# Patient Record
(demographics unavailable — no encounter records)

---

## 2025-05-23 NOTE — DISCUSSION/SUMMARY
[FreeTextEntry1] : Takotsubo cardiomyopathy Patient still has a lot of anxiety in life. Spoke to the patient in depth about reducing her responsibilities and anxiety and starting to take care of herself. Will do blood work with BMP BNP lipid panel CBC now. Patricio for 1 week. TTE.  [EKG obtained to assist in diagnosis and management of assessed problem(s)] : EKG obtained to assist in diagnosis and management of assessed problem(s)

## 2025-05-23 NOTE — HISTORY OF PRESENT ILLNESS
[FreeTextEntry1] : 71-year-old female with hypertension, anxiety, recent Takotsubo cardiomyopathy with a EF of 45% with basal hypokinesis and mid hypokinesis and apical akinesis on LV gram.  Patient is on beta-blocker and ARB.  Patient still sometimes having the feeling of shortness of breath and dizziness.  She still under a lot of pressure and responsibility of her pets horses and her  who is having Parkinson's disease.  She also has a home that requires a lot of attention and renovation.

## 2025-06-26 NOTE — HISTORY OF PRESENT ILLNESS
[FreeTextEntry1] : 71-year-old female with hypertension, anxiety, recent Takotsubo cardiomyopathy with a EF of 45% with basal hypokinesis and mid hypokinesis and apical akinesis on LV gram.  Patient is on beta-blocker and ARB.  Patient still sometimes having the feeling of shortness of breath and dizziness.  She still under a lot of pressure and responsibility of her pets horses and her  who is having Parkinson's disease.  She also has a home that requires a lot of attention and renovation.  6/26/2025 patient is still working a lot.  The new echo shows normal EF.  Patient is only on beta-blockers now.  Blood pressure is borderline.  Patient still has dyspnea on exertion.  BNP is normal.  Holter monitor showed short episodes of AT and 1 episode of NSVT 7 beats which patient did not feel.

## 2025-06-26 NOTE — DISCUSSION/SUMMARY
[FreeTextEntry1] : Takotsubo cardiomyopathy Patient still has a lot of anxiety in life. Spoke to the patient in depth about reducing her responsibilities and anxiety and starting to take care of herself.   EF has normalized. Holter is unremarkable with an episode of NSVT that patient did not feel. Will continue the beta-blocker. Cardiac rehab [EKG obtained to assist in diagnosis and management of assessed problem(s)] : EKG obtained to assist in diagnosis and management of assessed problem(s)

## 2025-07-10 NOTE — HISTORY OF PRESENT ILLNESS
[FreeTextEntry1] : 71-year-old female with hypertension, anxiety, recent Takotsubo cardiomyopathy with a EF of 45% with basal hypokinesis and mid hypokinesis and apical akinesis on LV gram.  Patient is on beta-blocker and ARB.  Patient still sometimes having the feeling of shortness of breath and dizziness.  She still under a lot of pressure and responsibility of her pets horses and her  who is having Parkinson's disease.  She also has a home that requires a lot of attention and renovation.  6/26/2025 patient is still working a lot.  The new echo shows normal EF.  Patient is only on beta-blockers now.  Blood pressure is borderline.  Patient still has dyspnea on exertion.  BNP is normal.  Holter monitor showed short episodes of AT and 1 episode of NSVT 7 beats which patient did not feel.  7/10/2025 patient is feeling fine.  No chest pain/shortness of breath/dyspnea on exertion/palpitation/syncope.  Patient is depressed and anxious regarding have a lot of responsibilities in life.

## 2025-07-10 NOTE — DISCUSSION/SUMMARY
[FreeTextEntry1] : Takotsubo cardiomyopathy Patient still has a lot of anxiety in life. Spoke to the patient in depth about reducing her responsibilities and anxiety and starting to take care of herself. Had a long conversation with the patient regarding her anxiety.  Patient will need to follow-up with her therapist/psych.  EF has normalized. Holter is unremarkable with an episode of NSVT that patient did not feel. Will continue the beta-blocker. Cardiac rehab [EKG obtained to assist in diagnosis and management of assessed problem(s)] : EKG obtained to assist in diagnosis and management of assessed problem(s)